# Patient Record
Sex: MALE | Race: WHITE | Employment: UNEMPLOYED | URBAN - METROPOLITAN AREA
[De-identification: names, ages, dates, MRNs, and addresses within clinical notes are randomized per-mention and may not be internally consistent; named-entity substitution may affect disease eponyms.]

---

## 2018-06-21 PROBLEM — I63.9 ISCHEMIC STROKE (HCC): Status: ACTIVE | Noted: 2018-06-21

## 2018-07-12 ENCOUNTER — OFFICE VISIT (OUTPATIENT)
Dept: OTHER | Age: 53
End: 2018-07-12

## 2018-07-12 VITALS
BODY MASS INDEX: 29.47 KG/M2 | SYSTOLIC BLOOD PRESSURE: 120 MMHG | HEIGHT: 76 IN | WEIGHT: 242 LBS | DIASTOLIC BLOOD PRESSURE: 84 MMHG

## 2018-07-12 DIAGNOSIS — G45.1 TIA INVOLVING LEFT INTERNAL CAROTID ARTERY: ICD-10-CM

## 2018-07-12 DIAGNOSIS — H93.11 TINNITUS OF RIGHT EAR: ICD-10-CM

## 2018-07-12 DIAGNOSIS — R42 DIZZINESS, NONSPECIFIC: Primary | ICD-10-CM

## 2018-07-12 DIAGNOSIS — I10 ESSENTIAL HYPERTENSION: ICD-10-CM

## 2018-07-12 PROCEDURE — 99999 PR OFFICE/OUTPT VISIT,PROCEDURE ONLY: CPT | Performed by: INTERNAL MEDICINE

## 2018-07-12 RX ORDER — MECLIZINE HYDROCHLORIDE 25 MG/1
25 TABLET ORAL 3 TIMES DAILY PRN
Qty: 30 TABLET | Refills: 0 | Status: SHIPPED | OUTPATIENT
Start: 2018-07-12 | End: 2018-07-22

## 2018-07-12 RX ORDER — COVID-19 ANTIGEN TEST
KIT MISCELLANEOUS
COMMUNITY

## 2018-07-20 ENCOUNTER — HOSPITAL ENCOUNTER (EMERGENCY)
Age: 53
Discharge: HOME | End: 2018-07-20

## 2018-07-20 VITALS
BODY MASS INDEX: 28.57 KG/M2 | WEIGHT: 242 LBS | TEMPERATURE: 98.4 F | RESPIRATION RATE: 20 BRPM | HEIGHT: 77 IN | OXYGEN SATURATION: 95 % | HEART RATE: 75 BPM | DIASTOLIC BLOOD PRESSURE: 99 MMHG | SYSTOLIC BLOOD PRESSURE: 148 MMHG

## 2018-07-20 DIAGNOSIS — K08.89 PAIN, DENTAL: Primary | ICD-10-CM

## 2018-07-20 DIAGNOSIS — K02.9 DENTAL CARIES: ICD-10-CM

## 2018-07-20 PROCEDURE — 99282 EMERGENCY DEPT VISIT SF MDM: CPT

## 2018-07-20 PROCEDURE — 6370000000 HC RX 637 (ALT 250 FOR IP): Performed by: NURSE PRACTITIONER

## 2018-07-20 RX ORDER — CLINDAMYCIN HYDROCHLORIDE 150 MG/1
450 CAPSULE ORAL ONCE
Status: COMPLETED | OUTPATIENT
Start: 2018-07-20 | End: 2018-07-20

## 2018-07-20 RX ORDER — CLINDAMYCIN HYDROCHLORIDE 150 MG/1
450 CAPSULE ORAL 3 TIMES DAILY
Qty: 90 CAPSULE | Refills: 0 | Status: SHIPPED | OUTPATIENT
Start: 2018-07-20 | End: 2018-07-30

## 2018-07-20 RX ADMIN — LIDOCAINE HYDROCHLORIDE 15 ML: 20 SOLUTION ORAL; TOPICAL at 18:06

## 2018-07-20 RX ADMIN — CLINDAMYCIN HYDROCHLORIDE 450 MG: 150 CAPSULE ORAL at 18:05

## 2018-07-20 ASSESSMENT — PAIN DESCRIPTION - LOCATION: LOCATION: JAW

## 2018-07-20 ASSESSMENT — PAIN DESCRIPTION - ORIENTATION: ORIENTATION: LEFT

## 2018-07-20 ASSESSMENT — PAIN SCALES - GENERAL: PAINLEVEL_OUTOF10: 8

## 2018-07-20 NOTE — ED PROVIDER NOTES
[facility]    CHIEF COMPLAINT  Abscess (tooth on the lower left side)      HISTORY OF PRESENT ILLNESS  El Scanlon is a 47 y.o. male who presents to the ED complaining of dental pain onset today. Patient has multiple dental caries and broken teeth noted throughout oral cavity. Patient reports lower left gum line began hurting this morning denies any relief with OTC medications. No fevers or chills. No dizziness or lightheadedness. No chest pain or shortness breath. No nausea, vomiting, or diarrhea. No difficulty with controlling secretions or excessive saliva. Patient reports that he has not have a dentist at this time. No other complaints, modifying factors or associated symptoms. Nursing notes reviewed. Past Medical History:   Diagnosis Date    Cerebral artery occlusion with cerebral infarction ([facility] [address]     Hyperlipidemia      History reviewed. No pertinent surgical history. History reviewed. No pertinent family history. Social History     Social History    Marital status: Single     Spouse name: N/A    Number of children: N/A    Years of education: N/A     Occupational History    Not on file. Social History Main Topics    Smoking status: Current Every Day Smoker     Packs/day: 1.00    Smokeless tobacco: Never Used      Comment: cut back to 1/2    Alcohol use No      Comment: no drinks sine 06/21/2018    Drug use: No    Sexual activity: Not on file     Other Topics Concern    Not on file     Social History Narrative    No narrative on file     No current facility-administered medications for this encounter.       Current Outpatient Prescriptions   Medication Sig Dispense Refill    Naproxen Sodium (ALEVE) 220 MG CAPS Take by mouth      meclizine (ANTIVERT) 25 MG tablet Take 1 tablet by mouth 3 times daily as needed for Dizziness 30 tablet 0    aspirin 81 MG EC tablet Take 1 tablet by mouth daily 30 tablet 3    simvastatin (ZOCOR) 20 MG

## 2018-07-20 NOTE — ED NOTES
Clindamycin script given. Pt verbalized understanding of d/c instructions.  Pt given a dental list.     Carolina Abdi LPN  07/70/62 [id number]

## 2018-10-18 ENCOUNTER — OFFICE VISIT (OUTPATIENT)
Dept: INTERNAL MEDICINE CLINIC | Age: 53
End: 2018-10-18

## 2018-10-18 VITALS
DIASTOLIC BLOOD PRESSURE: 80 MMHG | HEIGHT: 76 IN | WEIGHT: 243 LBS | SYSTOLIC BLOOD PRESSURE: 119 MMHG | OXYGEN SATURATION: 97 % | HEART RATE: 60 BPM | BODY MASS INDEX: 29.59 KG/M2

## 2018-10-18 DIAGNOSIS — G45.1 TIA INVOLVING LEFT INTERNAL CAROTID ARTERY: ICD-10-CM

## 2018-10-18 DIAGNOSIS — I10 ESSENTIAL HYPERTENSION: ICD-10-CM

## 2018-10-18 DIAGNOSIS — Z23 NEED FOR INFLUENZA VACCINATION: Primary | ICD-10-CM

## 2018-10-18 DIAGNOSIS — H93.11 TINNITUS OF RIGHT EAR: ICD-10-CM

## 2018-10-18 RX ORDER — SIMVASTATIN 20 MG
20 TABLET ORAL NIGHTLY
Qty: 30 TABLET | Refills: 3 | Status: SHIPPED | OUTPATIENT
Start: 2018-10-18 | End: 2019-03-04 | Stop reason: SDUPTHER

## 2019-03-04 RX ORDER — SIMVASTATIN 20 MG
20 TABLET ORAL NIGHTLY
Qty: 30 TABLET | Refills: 0 | Status: SHIPPED | OUTPATIENT
Start: 2019-03-04 | End: 2019-04-08 | Stop reason: SDUPTHER

## 2019-04-08 RX ORDER — SIMVASTATIN 20 MG
TABLET ORAL
Qty: 30 TABLET | Refills: 3 | Status: SHIPPED | OUTPATIENT
Start: 2019-04-08 | End: 2019-10-15 | Stop reason: SDUPTHER

## 2019-04-16 ENCOUNTER — OFFICE VISIT (OUTPATIENT)
Dept: INTERNAL MEDICINE CLINIC | Age: 54
End: 2019-04-16

## 2019-04-16 VITALS
SYSTOLIC BLOOD PRESSURE: 120 MMHG | BODY MASS INDEX: 28.25 KG/M2 | WEIGHT: 232 LBS | DIASTOLIC BLOOD PRESSURE: 80 MMHG | HEIGHT: 76 IN

## 2019-04-16 DIAGNOSIS — M54.50 LUMBAR BACK PAIN: Primary | ICD-10-CM

## 2019-04-16 DIAGNOSIS — J45.909 MILD REACTIVE AIRWAYS DISEASE, UNSPECIFIED WHETHER PERSISTENT: ICD-10-CM

## 2019-04-16 DIAGNOSIS — R79.89 ABNORMAL LFTS: ICD-10-CM

## 2019-04-16 DIAGNOSIS — D17.1 ABDOMINAL LIPOMA: ICD-10-CM

## 2019-04-16 PROCEDURE — 99214 OFFICE O/P EST MOD 30 MIN: CPT | Performed by: FAMILY MEDICINE

## 2019-04-16 RX ORDER — ALBUTEROL SULFATE 90 UG/1
2 AEROSOL, METERED RESPIRATORY (INHALATION) 4 TIMES DAILY PRN
Qty: 3 INHALER | Refills: 1 | Status: SHIPPED | OUTPATIENT
Start: 2019-04-16 | End: 2019-10-15 | Stop reason: SDUPTHER

## 2019-04-16 RX ORDER — ALBUTEROL SULFATE 90 UG/1
2 AEROSOL, METERED RESPIRATORY (INHALATION) EVERY 6 HOURS PRN
Qty: 1 INHALER | Refills: 3 | Status: SHIPPED | OUTPATIENT
Start: 2019-04-16 | End: 2020-01-13 | Stop reason: ALTCHOICE

## 2019-04-16 NOTE — PROGRESS NOTES
pt    Mild reactive airways disease, unspecified whether persistent  Prn proventil per orders  Tobacco cessation counseled. Lipomas  Advised pt that without bx or removal of lesion dont know for certain that these are lipomas (vs potential soft tissue or connective tissue neoplasm ie fibrosarcoma) but hx and exam and location is common and consitant with lipoma  Pt defers surg eval/referral at this time unless changes or worsening  Red flags and precautions reviewed    Other orders  -     albuterol sulfate HFA (PROVENTIL HFA) 108 (90 Base) MCG/ACT inhaler; Inhale 2 puffs into the lungs every 6 hours as needed for Wheezing  -     albuterol sulfate  (90 Base) MCG/ACT inhaler; Inhale 2 puffs into the lungs 4 times daily as needed for Wheezing      RTO 4-6 mo sooner if needed.

## 2019-04-23 ENCOUNTER — TELEPHONE (OUTPATIENT)
Dept: INTERNAL MEDICINE CLINIC | Age: 54
End: 2019-04-23

## 2019-10-15 ENCOUNTER — OFFICE VISIT (OUTPATIENT)
Dept: INTERNAL MEDICINE CLINIC | Age: 54
End: 2019-10-15

## 2019-10-15 VITALS
HEART RATE: 98 BPM | DIASTOLIC BLOOD PRESSURE: 80 MMHG | HEIGHT: 76 IN | BODY MASS INDEX: 27.52 KG/M2 | OXYGEN SATURATION: 97 % | SYSTOLIC BLOOD PRESSURE: 120 MMHG | WEIGHT: 226 LBS

## 2019-10-15 DIAGNOSIS — R79.89 ABNORMAL LFTS: ICD-10-CM

## 2019-10-15 DIAGNOSIS — R94.31 ABNORMAL EKG: ICD-10-CM

## 2019-10-15 DIAGNOSIS — I49.9 IRREGULAR HEARTBEAT: ICD-10-CM

## 2019-10-15 DIAGNOSIS — E78.5 HYPERLIPIDEMIA, UNSPECIFIED HYPERLIPIDEMIA TYPE: Primary | ICD-10-CM

## 2019-10-15 PROCEDURE — 99214 OFFICE O/P EST MOD 30 MIN: CPT | Performed by: FAMILY MEDICINE

## 2019-10-15 PROCEDURE — 90686 IIV4 VACC NO PRSV 0.5 ML IM: CPT | Performed by: FAMILY MEDICINE

## 2019-10-15 PROCEDURE — 90471 IMMUNIZATION ADMIN: CPT | Performed by: FAMILY MEDICINE

## 2019-10-15 PROCEDURE — 93000 ELECTROCARDIOGRAM COMPLETE: CPT | Performed by: FAMILY MEDICINE

## 2019-10-15 RX ORDER — SIMVASTATIN 20 MG
TABLET ORAL
Qty: 30 TABLET | Refills: 6 | Status: SHIPPED | OUTPATIENT
Start: 2019-10-15 | End: 2020-08-10

## 2019-10-15 RX ORDER — SILDENAFIL 50 MG/1
50 TABLET, FILM COATED ORAL PRN
Qty: 10 TABLET | Refills: 3 | Status: SHIPPED | OUTPATIENT
Start: 2019-10-15 | End: 2019-12-05

## 2019-10-16 ENCOUNTER — COMMUNITY OUTREACH (OUTPATIENT)
Dept: OTHER | Age: 54
End: 2019-10-16

## 2019-10-25 ENCOUNTER — COMMUNITY OUTREACH (OUTPATIENT)
Dept: OTHER | Age: 54
End: 2019-10-25

## 2019-10-28 ENCOUNTER — OFFICE VISIT (OUTPATIENT)
Dept: CARDIOLOGY CLINIC | Age: 54
End: 2019-10-28

## 2019-10-28 VITALS
SYSTOLIC BLOOD PRESSURE: 100 MMHG | BODY MASS INDEX: 28.67 KG/M2 | DIASTOLIC BLOOD PRESSURE: 60 MMHG | HEIGHT: 76 IN | WEIGHT: 235.4 LBS | HEART RATE: 59 BPM | OXYGEN SATURATION: 98 %

## 2019-10-28 DIAGNOSIS — I48.19 PERSISTENT ATRIAL FIBRILLATION (HCC): ICD-10-CM

## 2019-10-28 DIAGNOSIS — I10 HTN (HYPERTENSION), BENIGN: Primary | ICD-10-CM

## 2019-10-28 DIAGNOSIS — G45.1 TIA INVOLVING LEFT INTERNAL CAROTID ARTERY: ICD-10-CM

## 2019-10-28 DIAGNOSIS — R94.31 ABNORMAL EKG: ICD-10-CM

## 2019-10-28 DIAGNOSIS — Q21.12 PFO (PATENT FORAMEN OVALE): ICD-10-CM

## 2019-10-28 PROCEDURE — 99204 OFFICE O/P NEW MOD 45 MIN: CPT | Performed by: INTERNAL MEDICINE

## 2019-11-18 ENCOUNTER — HOSPITAL ENCOUNTER (OUTPATIENT)
Age: 54
Discharge: HOME | End: 2019-11-18

## 2019-11-18 ENCOUNTER — OFFICE VISIT (OUTPATIENT)
Dept: INTERNAL MEDICINE CLINIC | Age: 54
End: 2019-11-18

## 2019-11-18 VITALS
OXYGEN SATURATION: 96 % | WEIGHT: 230 LBS | HEART RATE: 95 BPM | SYSTOLIC BLOOD PRESSURE: 118 MMHG | DIASTOLIC BLOOD PRESSURE: 78 MMHG | BODY MASS INDEX: 28.01 KG/M2 | HEIGHT: 76 IN

## 2019-11-18 DIAGNOSIS — R79.89 ABNORMAL LFTS: ICD-10-CM

## 2019-11-18 DIAGNOSIS — J45.909 MILD REACTIVE AIRWAYS DISEASE, UNSPECIFIED WHETHER PERSISTENT: ICD-10-CM

## 2019-11-18 DIAGNOSIS — R79.89 ABNORMAL LFTS: Primary | ICD-10-CM

## 2019-11-18 LAB
A/G RATIO: 1.5 (ref 1.1–2.2)
ALBUMIN SERPL-MCNC: 4.7 G/DL (ref 3.4–5)
ALP BLD-CCNC: 60 U/L (ref 40–129)
ALT SERPL-CCNC: 192 U/L (ref 10–40)
ANION GAP SERPL CALCULATED.3IONS-SCNC: 10 MMOL/L (ref 3–16)
AST SERPL-CCNC: 125 U/L (ref 15–37)
BILIRUB SERPL-MCNC: 0.6 MG/DL (ref 0–1)
BUN BLDV-MCNC: 17 MG/DL (ref 7–20)
CALCIUM SERPL-MCNC: 9.7 MG/DL (ref 8.3–10.6)
CHLORIDE BLD-SCNC: 102 MMOL/L (ref 99–110)
CO2: 25 MMOL/L (ref 21–32)
CREAT SERPL-MCNC: 0.8 MG/DL (ref 0.9–1.3)
EXPIRATORY TIME: NORMAL
FEF 25-75% %PRED-PRE: NORMAL
FEF 25-75% PRED: NORMAL
FEF 25-75%-PRE: NORMAL
FEV1 %PRED-PRE: 77 %
FEV1 PRED: NORMAL
FEV1/FVC %PRED-PRE: NORMAL
FEV1/FVC PRED: NORMAL
FEV1/FVC: 88 %
FEV1: NORMAL
FVC %PRED-PRE: NORMAL
FVC PRED: NORMAL
FVC: NORMAL
GFR AFRICAN AMERICAN: >60
GFR NON-AFRICAN AMERICAN: >60
GLOBULIN: 3.2 G/DL
GLUCOSE BLD-MCNC: 94 MG/DL (ref 70–99)
PEF %PRED-PRE: NORMAL
PEF PRED: NORMAL
PEF-PRE: NORMAL
POTASSIUM SERPL-SCNC: 5.3 MMOL/L (ref 3.5–5.1)
SODIUM BLD-SCNC: 137 MMOL/L (ref 136–145)
TOTAL PROTEIN: 7.9 G/DL (ref 6.4–8.2)

## 2019-11-18 PROCEDURE — 80053 COMPREHEN METABOLIC PANEL: CPT

## 2019-11-18 PROCEDURE — 36415 COLL VENOUS BLD VENIPUNCTURE: CPT

## 2019-11-18 PROCEDURE — 99214 OFFICE O/P EST MOD 30 MIN: CPT | Performed by: INTERNAL MEDICINE

## 2019-11-18 ASSESSMENT — PATIENT HEALTH QUESTIONNAIRE - PHQ9
1. LITTLE INTEREST OR PLEASURE IN DOING THINGS: 0
2. FEELING DOWN, DEPRESSED OR HOPELESS: 0
SUM OF ALL RESPONSES TO PHQ QUESTIONS 1-9: 0
SUM OF ALL RESPONSES TO PHQ9 QUESTIONS 1 & 2: 0
SUM OF ALL RESPONSES TO PHQ QUESTIONS 1-9: 0

## 2019-11-18 ASSESSMENT — PULMONARY FUNCTION TESTS
FEV1/FVC: 88
FEV1_PERCENT_PREDICTED_PRE: 77

## 2019-11-26 ENCOUNTER — TELEPHONE (OUTPATIENT)
Dept: CARDIOLOGY CLINIC | Age: 54
End: 2019-11-26

## 2019-12-05 ENCOUNTER — OFFICE VISIT (OUTPATIENT)
Dept: CARDIOLOGY CLINIC | Age: 54
End: 2019-12-05

## 2019-12-05 VITALS
OXYGEN SATURATION: 99 % | BODY MASS INDEX: 29.37 KG/M2 | HEIGHT: 76 IN | WEIGHT: 241.2 LBS | HEART RATE: 69 BPM | SYSTOLIC BLOOD PRESSURE: 102 MMHG | DIASTOLIC BLOOD PRESSURE: 70 MMHG

## 2019-12-05 DIAGNOSIS — Q21.12 PFO (PATENT FORAMEN OVALE): ICD-10-CM

## 2019-12-05 DIAGNOSIS — G45.1 TIA INVOLVING LEFT INTERNAL CAROTID ARTERY: ICD-10-CM

## 2019-12-05 DIAGNOSIS — E78.2 MIXED HYPERLIPIDEMIA: ICD-10-CM

## 2019-12-05 DIAGNOSIS — I48.19 PERSISTENT ATRIAL FIBRILLATION (HCC): Primary | ICD-10-CM

## 2019-12-05 PROCEDURE — 99213 OFFICE O/P EST LOW 20 MIN: CPT | Performed by: NURSE PRACTITIONER

## 2019-12-09 ASSESSMENT — ENCOUNTER SYMPTOMS
COUGH: 1
SHORTNESS OF BREATH: 0

## 2020-01-13 ENCOUNTER — OFFICE VISIT (OUTPATIENT)
Dept: INTERNAL MEDICINE CLINIC | Age: 55
End: 2020-01-13

## 2020-01-13 VITALS
WEIGHT: 246 LBS | HEART RATE: 92 BPM | OXYGEN SATURATION: 96 % | DIASTOLIC BLOOD PRESSURE: 80 MMHG | SYSTOLIC BLOOD PRESSURE: 120 MMHG | BODY MASS INDEX: 29.96 KG/M2 | HEIGHT: 76 IN

## 2020-01-13 PROCEDURE — 99214 OFFICE O/P EST MOD 30 MIN: CPT | Performed by: INTERNAL MEDICINE

## 2020-01-13 RX ORDER — MONTELUKAST SODIUM 10 MG/1
10 TABLET ORAL DAILY
Qty: 30 TABLET | Refills: 3 | Status: SHIPPED | OUTPATIENT
Start: 2020-01-13 | End: 2020-09-21 | Stop reason: ALTCHOICE

## 2020-01-13 RX ORDER — ATORVASTATIN CALCIUM 40 MG/1
40 TABLET, FILM COATED ORAL DAILY
Qty: 30 TABLET | Refills: 5 | Status: SHIPPED | OUTPATIENT
Start: 2020-01-13 | End: 2020-08-10 | Stop reason: SDUPTHER

## 2020-01-13 NOTE — PROGRESS NOTES
SUBJECTIVE:  Follow up from 11/18/19 for atrial fibrillation. Smoking 0.5 ppd. History of present illness:  Started on aspirin 325 mg / day by Dr. Lay Sommers. Seen by cardiology (Dr. Romsery Carmona) 10/28 and anticoagulated with apixaban 5 mg BID for [id number] Score of 3 (HTN and prior stroke). History of TIAs 2017. His echo from 06/21/18 showed his EF was 55% with moderately dilated left atrium. A bubble study showed evidence of right to left shunting. His EKG from 10/15/19 showed AFib HR 92. He had a stroke about 2 years ago. Echo with bubble study pending. Uses albuterol inhaler with higher humidity. MEDICATIONS:  aspirin 81 MG tablet Take 81 mg by mouth daily   apixaban (ELIQUIS) 5 MG TABS tablet Take 1 tablet by mouth 2 times daily   simvastatin (ZOCOR) 20 MG tablet TAKE ONE TABLET BY MOUTH ONCE NIGHTLY   Naproxen Sodium (ALEVE) 220 MG CAPS Take by mouth   Multiple Vitamins-Minerals (THERAPEUTIC MULTIVITAMIN-MINERALS) tablet Take 1 tablet by mouth daily       LABS: 11/18/2019     K 5.3 (H)      CO2 25   BUN 17   CREATININE 0.8 (L)   GLUCOSE 94     Calcium 9.7    Total Protein 7.9    Alb 4.7    Albumin/Globulin Ratio 1.5    Total Bilirubin 0.6    Alkaline Phosphatase 60                OBJECTIVE:    /80   Pulse 92   Ht 6' 4\" (1.93 m)   Wt 246 lb (111.6 kg)   SpO2 96%   BMI 29.94 kg/m²   HEENT:  Oropharynx clear, no lymphadenopathy,   LUNGS:  Clear and without wheezes  HEART:  Regular rhythm, no appreciable murmur  ABD:  Benign, active bowel sounds  EXT:  No edema, pulses palpable  NEURO:  No focal neurologic deficits noted. ASSESSMENT / PLAN:   1. Persistent atrial fibrillation:  Continue taking apixaban (Eliquis) 5 mg twice per day. This is especially important given the history of a transient ischemic attack (TIA) and \"patent foramen ovale\". Repeat echocardiogram with bubble study pending in addition to heart monitor awaiting insurance status.     2. History of TIA:   Continue taking low dose aspirin 81 mg every morning and atorvastatin (Lipitor) 40 mg every evening. 3. Blood pressure good today at 120/80, goal less than 130/80. 4. Smoking cessation:  Gradually reduce the number of cigarettes per day until there are NONE. We can prescribe a 21 mg nicotine patch that you place every night. 5. Elevated liver enzymes (June 2018): Liver enzymes remain elevated at  and  (normal 30). We will check an acute hepatitis panel once insurance approved. 6. History of exercise induced bronchoconstriction:  Baseline spirometry (pulmonary function test) was normal last visit. START taking montelukast (Singulair) 10 mg every evening.        Follow-up as needed or SIX weeks  Schedule echocardiogram and 48 hour Holter monitor when insurance approved.

## 2020-01-13 NOTE — PATIENT INSTRUCTIONS
1. Persistent atrial fibrillation:  Continue taking apixaban (Eliquis) 5 mg twice per day. This is especially important given the history of a transient ischemic attack (TIA) and \"patent foramen ovale\". Repeat echocardiogram with bubble study pending in addition to heart monitor awaiting insurance status. 2. History of TIA:   Continue taking low dose aspirin 81 mg every morning and atorvastatin (Lipitor) 40 mg every evening. 3. Blood pressure good today at 120/80, goal less than 130/80. 4. Smoking cessation:  Gradually reduce the number of cigarettes per day until there are NONE. We can prescribe a 21 mg nicotine patch that you place every night. 5. Elevated liver enzymes (June 2018): Liver enzymes remain elevated at  and  (normal 30). We will check an acute hepatitis panel once insurance approved. 6. History of exercise induced bronchoconstriction:  Baseline spirometry (pulmonary function test) was normal last visit. START taking montelukast (Singulair) 10 mg every evening.        Follow-up as needed or SIX weeks  Schedule echocardiogram and 48 hour Holter monitor when insurance approved.

## 2020-08-10 ENCOUNTER — OFFICE VISIT (OUTPATIENT)
Dept: INTERNAL MEDICINE CLINIC | Age: 55
End: 2020-08-10

## 2020-08-10 VITALS
DIASTOLIC BLOOD PRESSURE: 80 MMHG | HEIGHT: 76 IN | SYSTOLIC BLOOD PRESSURE: 120 MMHG | TEMPERATURE: 97.7 F | HEART RATE: 86 BPM | OXYGEN SATURATION: 96 % | WEIGHT: 246 LBS | BODY MASS INDEX: 29.96 KG/M2

## 2020-08-10 PROCEDURE — 99214 OFFICE O/P EST MOD 30 MIN: CPT | Performed by: INTERNAL MEDICINE

## 2020-08-10 RX ORDER — AMITRIPTYLINE HYDROCHLORIDE 10 MG/1
10 TABLET, FILM COATED ORAL NIGHTLY
Qty: 30 TABLET | Refills: 3 | Status: SHIPPED | OUTPATIENT
Start: 2020-08-10

## 2020-08-10 RX ORDER — ATORVASTATIN CALCIUM 40 MG/1
40 TABLET, FILM COATED ORAL DAILY
Qty: 30 TABLET | Refills: 5 | Status: SHIPPED | OUTPATIENT
Start: 2020-08-10

## 2020-08-10 NOTE — PROGRESS NOTES
SUBJECTIVE:  Follow up from 1/13/20 for atrial fibrillation and migraine. Trigger for migraines is heat and humidity, and relieved by decreased ambient temperature. He works moving furniture. Migraine every day for the past two weeks (10:30 am to noon). He has not been taking atorvastatin due to cost.  He is not smoking when he works and down to a few cigs/day.       History of present illness:  Started on aspirin 325 mg / day by Dr. Ashok Ferraro by cardiology (Dr. Den Le) 10/28 and anticoagulated with apixaban 5 mg BID for [id number] Score of 3 (HTN and prior stroke).  History of TIAs 2017.  His echo from 06/21/18 showed his EF was 55% with moderately dilated left atrium. A bubble study showed evidence of right to left shunting.  His EKG from 10/15/19 showed AFib HR 92. He had a stroke about 2 years ago. Kimo Unger with bubble study pending.  Uses albuterol inhaler with higher humidity.      MEDICATIONS:  apixaban (ELIQUIS) 5 MG TABS tablet Take 1 tablet by mouth 2 times daily   atorvastatin (LIPITOR) 40 MG tablet Take 1 tablet by mouth daily   montelukast (SINGULAIR) 10 MG tablet Take 1 tablet by mouth daily   aspirin 81 MG tablet Take 81 mg by mouth daily   simvastatin (ZOCOR) 20 MG tablet TAKE ONE TABLET BY MOUTH ONCE NIGHTLY   Naproxen Sodium (ALEVE) 220 MG CAPS Take by mouth   Multiple Vitamins-Minerals (THERAPEUTIC MULTIVITAMIN-MINERALS) tablet Take 1 tablet by mouth daily       Lab Results   Component Value Date    WBC 5.4 06/21/2018    HGB 15.6 06/21/2018     06/21/2018    MCV 90.0 06/21/2018     Lab Results   Component Value Date     11/18/2019    K 5.3 (H) 11/18/2019     11/18/2019    CO2 25 11/18/2019    BUN 17 11/18/2019    CREATININE 0.8 (L) 11/18/2019    GLUCOSE 94 11/18/2019       OBJECTIVE:    /80 (Site: Right Upper Arm, Position: Sitting)   Pulse 86   Temp 97.7 °F (36.5 °C)   Ht 6' 4\" (1.93 m)   Wt 246 lb (111.6 kg)   SpO2 96%   BMI 29.94 kg/m²   HEENT:  Oropharynx clear, no lymphadenopathy,   LUNGS:  Clear and without wheezes  HEART:  Regular rhythm, no appreciable murmur  ABD:  Benign, active bowel sounds  EXT:  No edema, pulses palpable  NEURO:  No focal neurologic deficits noted. ASSESSMENT / PLAN:   1. Persistent atrial fibrillation:  Continue taking apixaban (Eliquis) 5 mg twice per day.  This is especially important given the history of a transient ischemic attack (TIA) and \"patent foramen ovale\".   Eventually we will need to repeat echocardiogram with bubble study pending in addition to heart monitor awaiting insurance status. 2. History of TIA:   Continue taking low dose aspirin 81 mg every morning and START back taking atorvastatin (Lipitor) 40 mg every evening.  This should be FREE at [facility]. 3. Blood pressure good today at 120/80, goal less than 130/80.    4. Smoking cessation:  Gradually reduce the number of cigarettes per day until there are NONE.  We can prescribe a 21 mg nicotine patch that you place every night.    5. Elevated liver enzymes (June 2018):  Liver enzymes remain elevated at  and  (normal 30). We will check an acute hepatitis panel once insurance approved. 6. Migraines:  Trigger can be humidity and dehydration. START taking amitriptyline (Elavil) 10 mg tablet every evening at bedtime to reduce the frequency and severity.           Follow-up in 3 weeks

## 2020-08-10 NOTE — PATIENT INSTRUCTIONS
1. Persistent atrial fibrillation:  Continue taking apixaban (Eliquis) 5 mg twice per day.  This is especially important given the history of a transient ischemic attack (TIA) and \"patent foramen ovale\".   Eventually we will need to repeat echocardiogram with bubble study pending in addition to heart monitor awaiting insurance status. 2. History of TIA:   Continue taking low dose aspirin 81 mg every morning and START back taking atorvastatin (Lipitor) 40 mg every evening.  This [facility] be FREE at [facility]. 3. Blood pressure good today at 120/80, goal less than 130/80.    4. Smoking cessation:  Gradually reduce the number of cigarettes per day until there are NONE.  We can prescribe a 21 mg nicotine patch that you place every night.    5. Elevated liver enzymes (June 2018):  Liver enzymes remain elevated at  and  (normal 30). We will check an acute hepatitis panel once insurance approved. 6. Migraines:  Trigger can be humidity and dehydration. START taking amitriptyline (Elavil) 10 mg tablet every evening at bedtime to reduce the frequency and severity.           Follow-up in 3-4 weeks

## 2020-08-12 ENCOUNTER — TELEPHONE (OUTPATIENT)
Dept: OTHER | Age: 55
End: 2020-08-12

## 2020-08-12 NOTE — TELEPHONE ENCOUNTER
KATARZYNA-KRISTEN attempted to follow up with patient as re-referred by [facility]. SW unable to leave voicemail due to one not being set up. SW to try again.

## 2020-09-21 ENCOUNTER — OFFICE VISIT (OUTPATIENT)
Dept: INTERNAL MEDICINE CLINIC | Age: 55
End: 2020-09-21

## 2020-09-21 VITALS
HEIGHT: 77 IN | BODY MASS INDEX: 29.16 KG/M2 | TEMPERATURE: 97.6 F | WEIGHT: 247 LBS | OXYGEN SATURATION: 97 % | SYSTOLIC BLOOD PRESSURE: 120 MMHG | DIASTOLIC BLOOD PRESSURE: 80 MMHG | HEART RATE: 85 BPM

## 2020-09-21 PROCEDURE — 99214 OFFICE O/P EST MOD 30 MIN: CPT | Performed by: INTERNAL MEDICINE

## 2020-09-21 NOTE — PATIENT INSTRUCTIONS
1. Persistent atrial fibrillation:  Continue taking apixaban (Eliquis) 5 mg twice per day.  This is especially important given the history of a transient ischemic attack (TIA) and \"patent foramen ovale\".   Eventually we will need to repeat echocardiogram with bubble study pending in addition to heart monitor awaiting insurance status.    2. History of TIA:   Continue taking low dose aspirin 81 mg every morning and atorvastatin (Lipitor) 40 mg every evening.    3. Blood pressure good today at 120/80, goal less than 130/80.    4. Smoking cessation:  Gradually reduce the number of cigarettes per day until there are NONE.  We can prescribe a 21 mg nicotine patch that you place every night.    5. Elevated liver enzymes (June 2018):  Liver enzymes remain elevated at  and  (normal 30).  We will check an acute hepatitis panel once insurance approved. 6. Migraines:  Continue taking amitriptyline (Elavil) 10 mg tablet every evening at bedtime to reduce the frequency and severity.           Follow-up in three months (by the end of the year)

## 2021-03-22 ENCOUNTER — TELEPHONE (OUTPATIENT)
Dept: INTERNAL MEDICINE CLINIC | Age: 56
End: 2021-03-22

## 2021-04-20 ENCOUNTER — TELEPHONE (OUTPATIENT)
Dept: INTERNAL MEDICINE CLINIC | Age: 56
End: 2021-04-20